# Patient Record
Sex: MALE | Race: WHITE | URBAN - METROPOLITAN AREA
[De-identification: names, ages, dates, MRNs, and addresses within clinical notes are randomized per-mention and may not be internally consistent; named-entity substitution may affect disease eponyms.]

---

## 2017-01-31 ENCOUNTER — TRANSFERRED RECORDS (OUTPATIENT)
Dept: HEALTH INFORMATION MANAGEMENT | Facility: CLINIC | Age: 67
End: 2017-01-31

## 2017-04-27 ENCOUNTER — TRANSFERRED RECORDS (OUTPATIENT)
Dept: HEALTH INFORMATION MANAGEMENT | Facility: CLINIC | Age: 67
End: 2017-04-27

## 2017-05-08 ENCOUNTER — TELEPHONE (OUTPATIENT)
Dept: CT IMAGING | Facility: CLINIC | Age: 67
End: 2017-05-08

## 2017-05-08 DIAGNOSIS — C83.398 DIFFUSE LARGE B-CELL LYMPHOMA OF EXTRANODAL SITE: Primary | ICD-10-CM

## 2017-05-08 NOTE — TELEPHONE ENCOUNTER
Patient called in to schedule for Imaging exam  : CT Chest/Abdomen/Pelvis w Contrast, Pt would like to bernadette. same exam as last time. Chest/Abdomen/Pelvis w Contrast   But there was no order for us to schedule the exam.    If provider wishes for patient to have this exam done, please put in the order.    When this is completed, send a message back to us and we will call pt to schedule.  Pt prefers us to call them back at : [phone]    Thank you for your time. This is very much appreciated.     Thank you,  [facility]    Office: [phone]   Fax: [phone]

## 2017-07-24 ENCOUNTER — DOCUMENTATION ONLY (OUTPATIENT)
Dept: LAB | Facility: CLINIC | Age: 67
End: 2017-07-24

## 2017-07-26 ENCOUNTER — RADIANT APPOINTMENT (OUTPATIENT)
Dept: CT IMAGING | Facility: CLINIC | Age: 67
End: 2017-07-26
Attending: INTERNAL MEDICINE
Payer: MEDICARE

## 2017-07-26 DIAGNOSIS — C83.398 DIFFUSE LARGE B-CELL LYMPHOMA OF EXTRANODAL SITE: ICD-10-CM

## 2017-07-26 DIAGNOSIS — C83.398 DIFFUSE LARGE B-CELL LYMPHOMA OF EXTRANODAL SITE: Primary | ICD-10-CM

## 2017-07-26 LAB
ALBUMIN SERPL-MCNC: 4.1 G/DL (ref 3.4–5)
ALP SERPL-CCNC: 74 U/L (ref 40–150)
ALT SERPL W P-5'-P-CCNC: 42 U/L (ref 0–70)
ANION GAP SERPL CALCULATED.3IONS-SCNC: 9 MMOL/L (ref 3–14)
AST SERPL W P-5'-P-CCNC: 40 U/L (ref 0–45)
BASOPHILS # BLD AUTO: 0 10E9/L (ref 0–0.2)
BASOPHILS NFR BLD AUTO: 0.4 %
BILIRUB SERPL-MCNC: 0.7 MG/DL (ref 0.2–1.3)
BUN SERPL-MCNC: 9 MG/DL (ref 7–30)
CALCIUM SERPL-MCNC: 8.6 MG/DL (ref 8.5–10.1)
CHLORIDE SERPL-SCNC: 99 MMOL/L (ref 94–109)
CO2 SERPL-SCNC: 25 MMOL/L (ref 20–32)
CREAT SERPL-MCNC: 0.7 MG/DL (ref 0.66–1.25)
DIFFERENTIAL METHOD BLD: ABNORMAL
EOSINOPHIL # BLD AUTO: 0.1 10E9/L (ref 0–0.7)
EOSINOPHIL NFR BLD AUTO: 1.4 %
ERYTHROCYTE [DISTWIDTH] IN BLOOD BY AUTOMATED COUNT: 13.3 % (ref 10–15)
GFR SERPL CREATININE-BSD FRML MDRD: NORMAL ML/MIN/1.7M2
GLUCOSE SERPL-MCNC: 93 MG/DL (ref 70–99)
HCT VFR BLD AUTO: 40.8 % (ref 40–53)
HGB BLD-MCNC: 14.6 G/DL (ref 13.3–17.7)
LDH SERPL L TO P-CCNC: 202 U/L (ref 85–227)
LYMPHOCYTES # BLD AUTO: 0.7 10E9/L (ref 0.8–5.3)
LYMPHOCYTES NFR BLD AUTO: 14.2 %
MCH RBC QN AUTO: 32.8 PG (ref 26.5–33)
MCHC RBC AUTO-ENTMCNC: 35.8 G/DL (ref 31.5–36.5)
MCV RBC AUTO: 92 FL (ref 78–100)
MONOCYTES # BLD AUTO: 0.6 10E9/L (ref 0–1.3)
MONOCYTES NFR BLD AUTO: 12.8 %
NEUTROPHILS # BLD AUTO: 3.6 10E9/L (ref 1.6–8.3)
NEUTROPHILS NFR BLD AUTO: 71.2 %
PLATELET # BLD AUTO: 143 10E9/L (ref 150–450)
POTASSIUM SERPL-SCNC: 3.9 MMOL/L (ref 3.4–5.3)
PROT SERPL-MCNC: 7.3 G/DL (ref 6.8–8.8)
RBC # BLD AUTO: 4.45 10E12/L (ref 4.4–5.9)
SODIUM SERPL-SCNC: 133 MMOL/L (ref 133–144)
WBC # BLD AUTO: 5 10E9/L (ref 4–11)

## 2017-07-26 PROCEDURE — 36415 COLL VENOUS BLD VENIPUNCTURE: CPT | Performed by: INTERNAL MEDICINE

## 2017-07-26 PROCEDURE — 80053 COMPREHEN METABOLIC PANEL: CPT | Performed by: INTERNAL MEDICINE

## 2017-07-26 PROCEDURE — 71260 CT THORAX DX C+: CPT | Performed by: RADIOLOGY

## 2017-07-26 PROCEDURE — 74177 CT ABD & PELVIS W/CONTRAST: CPT | Performed by: RADIOLOGY

## 2017-07-26 PROCEDURE — 85025 COMPLETE CBC W/AUTO DIFF WBC: CPT | Performed by: INTERNAL MEDICINE

## 2017-07-26 PROCEDURE — 83615 LACTATE (LD) (LDH) ENZYME: CPT | Performed by: INTERNAL MEDICINE

## 2017-07-26 RX ORDER — IOPAMIDOL 755 MG/ML
103 INJECTION, SOLUTION INTRAVASCULAR ONCE
Status: COMPLETED | OUTPATIENT
Start: 2017-07-26 | End: 2017-07-26

## 2017-07-26 RX ADMIN — IOPAMIDOL 103 ML: 755 INJECTION, SOLUTION INTRAVASCULAR at 10:12

## 2017-08-03 ENCOUNTER — ONCOLOGY VISIT (OUTPATIENT)
Dept: ONCOLOGY | Facility: CLINIC | Age: 67
End: 2017-08-03
Payer: MEDICARE

## 2017-08-03 VITALS
BODY MASS INDEX: 25.18 KG/M2 | WEIGHT: 170 LBS | HEART RATE: 86 BPM | TEMPERATURE: 97.7 F | OXYGEN SATURATION: 97 % | RESPIRATION RATE: 20 BRPM | HEIGHT: 69 IN | DIASTOLIC BLOOD PRESSURE: 91 MMHG | SYSTOLIC BLOOD PRESSURE: 144 MMHG

## 2017-08-03 DIAGNOSIS — C83.398 DIFFUSE LARGE B-CELL LYMPHOMA OF EXTRANODAL SITE: Primary | ICD-10-CM

## 2017-08-03 PROCEDURE — 99214 OFFICE O/P EST MOD 30 MIN: CPT | Performed by: INTERNAL MEDICINE

## 2017-08-03 RX ORDER — ALFUZOSIN HYDROCHLORIDE 10 MG/1
10 TABLET, EXTENDED RELEASE ORAL DAILY
COMMUNITY

## 2017-08-03 ASSESSMENT — PAIN SCALES - GENERAL: PAINLEVEL: NO PAIN (0)

## 2017-08-03 NOTE — PROGRESS NOTES
ONCOLOGY DIAGNOSIS:  August 2015, diagnosed with stage IIIB diffuse large B-cell lymphoma, IPI score 4 (high).       CT initial staging from September 2015 revealed extensive infiltrative left mid abdominal mass measuring at least 15 x 10 cm, with an SUV of 31, with associated direct invasion of the spleen and encasement of the upper abdominal bowel loops and vascular structures.  There was associated extensive multifocal mesenteric, retroperitoneal, retrocrural, mediastinal, and supraclavicular adenopathy, compatible with widespread lymphoma.  Hypermetabolic pleural-based mass in left lower hemithorax with associated small to moderate left pleural effusion.  FNA of peripancreatic lymph node (August 26, 2015) revealed diffuse large B-cell lymphoma, germinal center type.  Flow cytometry showed B-cell lymphoma with kappa light chain restriction.  CD10 positive, CD20 positive, with CD10 indicating germinal center type.  Bone marrow biopsy performed on August 20, 2015, revealed no morphologic evidence of lymphoma, 30% cellular marrow with normal maturation of cell lines, absent iron stores.        THERAPY TO DATE:  September 2015 to December 2015: R-CHOP.        INTERVAL HISTORY:  Myke Shankar is a 67-year-old gentleman diagnosed with stage IIIB diffuse large B-cell lymphoma 08/2015 after presenting to an urgent care in [place] with a 2-month history of epigastric discomfort associated with nausea.  The patient went on to receive 6 cycles of R-CHOP and presents to clinic in followup.  He does split his time between [place] and [place] and has received his treatment in both places. Patient states he is doing well since last seen. Did have a cough that he is seeing his primary for. Recently was taken off lisinopril thinking this may be the culprit. Otherwise, denies any fevers, chills, nausea, vomiting, chest pain, shortness of breath. Neuropathy in feet stable, does not effect ADL. No current abdominal discomfort.   "No new palpable masses and the remainder of comprehensive review of systems is negative.      PAST MEDICAL HISTORY: (No new medical history since last visit per patient.)  1.  Diffuse large B-cell lymphoma, IPI score 4, see above.   2.  Hypertension.   3.  Hypercholesterolemia.      ALLERGIES:  No known drug allergies.      FAMILY HISTORY:  Maternal grandfather had gastric cancer. (Not addressed on today's visit.)     SOCIAL HISTORY:  , has 3 children. Comes in alone today. Splits his time between [place] and [place]. Non-smoker.      PHYSICAL EXAMINATION:   VITAL SIGNS: BP (!) 144/91  Pulse 86  Temp 97.7  F (36.5  C) (Oral)  Resp 20  Ht 1.753 m (5' 9.02\")  Wt 77.1 kg (170 lb)  SpO2 97%  BMI 25.09 kg/m2  GENERAL:  Comfortable, in no acute distress, pleasant.   HEENT:  Atraumatic, normocephalic.  Pupils equal, round, reactive.  Sclerae anicteric.  Oropharynx moist mucus membranes.  No lesions, ulcers or exudate.   NECK:  Supple, full range of motion.  Trachea midline.   HEART:  Regular rate and rhythm, normal S1, S2.  No murmurs or gallops.  No edema.   LUNGS:  Clear to auscultation bilaterally.  No crackles or wheezes.  Normal respiratory effort.   GASTROINTESTINAL:  Abdomen positive bowel sounds.  Soft, nontender, nondistended, no hepatosplenomegaly.   EXTREMITIES:  No cyanosis, warm.   MUSCULOSKELETAL:  No point tenderness.   LYMPHATICS:  No cervical, supraclavicular, axillary nodes palpable.   SKIN:  No petechiae or rashes.   NEUROLOGIC:  Alert and oriented.      LABORATORY DATA:     7/26/2017   AlkPhos 74   ALT 42   AST 40      Glucose 93   WBC 5.0   Hemoglobin 14.6   Hematocrit 40.8   Platelet 143 (L)     IMAGING DATA:    CT C/A/P (7/26/17):  continued complete response by Lugano criteria:  1. Stable tiny sub-3 mm bilateral pulmonary nodules. No new pulmonary nodules. Close attention on follow-up studies is recommended.  2. Stable hypoattenuating foci in the liver, as described above. " Close attention on follow-up studies is recommended.  3. 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  4. Irregular appearing hypoattenuation in the spleen, stable. Close attention on follow-up studies is recommended.    CT chest, abdomen and pelvis from 07/26/2016 shows scattered tiny lung nodules, no suspicious lung nodules, no pathological lymphadenopathy in chest, abdomen and pelvis, evidence of treated lymphoma upper abdomen including stable hypoattenuating lesion in the spleen and slight adjacent stranding soft tissue density, occluded splenic vein with gastric varices.      ASSESSMENT AND PLAN:   1.  Stage IIIB, diffuse large B-cell lymphoma. No evidence of recurrence or most recent imaging.  WBC and platelets are stable. Reviewed NCCN guidelines for surveillance. Patient in nearly 2 years out from completion of therapy (12/2017). Recommend following up with Oncologist in [place] when he goes back. RTC to see us in 1 year. Would do one more CT in [place] after which would only do scans if develops concerning symptoms.   2. Neuropathy.  Stable.  Does not affect activities of daily living.  No intervention at this time.   3. HCM. Discussed the importance of healthy lifestyle with diet and exercise.    CONRADO GILLETTE MD        cc: Copy for Provider        Tomy Mustafa MD

## 2017-08-03 NOTE — MR AVS SNAPSHOT
After Visit Summary   8/3/2017    Myke Shankar    MRN: [id number]           Patient Information     Date Of Birth          1950        Visit Information        Provider Department      8/3/2017 10:30 AM Dana Reis MD [facility]        Today's Diagnoses     Diffuse large B-cell lymphoma of extranodal site (H)    -  1       Follow-ups after your visit        Your next 10 appointments already scheduled     Jul 18, 2018 11:30 AM CDT   LAB with LAB FIRST FLOOR [facility] ([facility])    [address] [place] [address] [id number] [phone]           Please do not eat 10-12 hours before your appointment if you are coming in fasting for labs on lipids, cholesterol, or glucose (sugar). This does not apply to pregnant women. Water, hot tea and black coffee (with nothing added) are okay. Do not drink other fluids, diet soda or chew gum.            Jul 18, 2018 12:00 PM CDT   Return Visit with Dana Reis MD   [facility] ([facility])    [address] [place] [address] [id number] [phone]              Future tests that were ordered for you today     Open Future Orders        Priority Expected Expires Ordered    *CBC with platelets differential Routine  4/25/2019 4/25/2018    Comprehensive metabolic panel Routine 4/25/2018 4/25/2019 4/25/2018    Lactate Dehydrogenase Routine  4/25/2019 4/25/2018            Who to contact     If you have questions or need follow up information about today's clinic visit or your schedule please contact [facility] directly at [phone].  Normal or non-critical lab and imaging results will be communicated to you by MyChart, letter or phone within 4 business days after the clinic has received the results. If you do not hear from us within 7 days, please contact the clinic through MyChart or phone. If you have a critical or  "abnormal lab result, we will notify you by phone as soon as possible.  Submit refill requests through [email or web address] or call your pharmacy and they will forward the refill request to us. Please allow 3 business days for your refill to be completed.          Additional Information About Your Visit        EatingWellhart Information     [email or web address] gives you secure access to your electronic health record. If you see a primary care provider, you can also send messages to your care team and make appointments. If you have questions, please call your primary care clinic.  If you do not have a primary care provider, please call [phone] and they will assist you.      [email or web address] is an electronic gateway that provides easy, online access to your medical records. With [email or web address], you can request a clinic appointment, read your test results, renew a prescription or communicate with your care team.     To access your existing account, please contact your [facility] or call [phone] for assistance.        Care EveryWhere ID     This is your Care EveryWhere ID. This could be used by other organizations to access your [facility] medical records  [id number]        Your Vitals Were     Pulse Temperature Respirations Height Pulse Oximetry BMI (Body Mass Index)    86 97.7  F (36.5  C) (Oral) 20 1.753 m (5' 9.02\") 97% 25.09 kg/m2       Blood Pressure from Last 3 Encounters:   08/03/17 (!) 144/91   07/28/16 121/78   06/07/16 (!) 128/91    Weight from Last 3 Encounters:   08/03/17 77.1 kg (170 lb)   07/28/16 76.6 kg (168 lb 12.8 oz)   06/07/16 73.9 kg (163 lb)              Today, you had the following     No orders found for display       Primary Care Provider Office Phone # Fax #    Tomy Damico [phone]       [facility] [address]  [facility] [address]        Equal Access to Services     PABLO RAMSEY AH: Sara Quezada, blayneda dave, qaybta edgard arteaga " daija smartayan carlagiovanni raygoza'aan ah. So [facility] [phone].    ATENCIÓN: Si habla mary, tiene a eldridge disposición servicios gratuitos de asistencia lingüística. Jamar al [phone].    We comply with applicable federal civil rights laws and [place] laws. We do not discriminate on the basis of race, color, national origin, age, disability, sex, sexual orientation, or gender identity.            Thank you!     Thank you for choosing [facility]  for your care. Our goal is always to provide you with excellent care. Hearing back from our patients is one way we can continue to improve our services. Please take a few minutes to complete the written survey that you may receive in the mail after your visit with us. Thank you!             Your Updated Medication List - Protect others around you: Learn how to safely use, store and throw away your medicines at www.disposemymeds.org.          This list is accurate as of 8/3/17 11:59 PM.  Always use your most recent med list.                   Brand Name Dispense Instructions for use Diagnosis    alfuzosin 10 MG 24 hr tablet    UROXATRAL     Take 10 mg by mouth daily        LISINOPRIL PO      Take 10 mg by mouth daily        PRILOSEC PO      Take 20 mg by mouth every morning        SIMVASTATIN PO      Take 10 mg by mouth At Bedtime

## 2017-08-03 NOTE — NURSING NOTE
"Oncology Rooming Note    August 3, 2017 10:31 AM   Myke Shankar is a 67 year old male who presents for:    Chief Complaint   Patient presents with     Oncology Clinic Visit     f/u w/CT and lab results     Initial Vitals: There were no vitals taken for this visit. Estimated body mass index is 24.92 kg/(m^2) as calculated from the following:    Height as of 7/28/16: 1.753 m (5' 9.02\").    Weight as of 7/28/16: 76.6 kg (168 lb 12.8 oz). There is no height or weight on file to calculate BSA.  Data Unavailable Comment: Data Unavailable   No LMP for male patient.  Allergies reviewed: Yes  Medications reviewed: Yes    Medications: Medication refills not needed today.  Pharmacy name entered into [facility]: [facility] [address] [place] [address] [address] AT [facility] [address]    Clinical concerns:    8 minutes for nursing intake (face to face time)     JASMIN LITTLE LPN              "

## 2018-01-25 ENCOUNTER — TRANSFERRED RECORDS (OUTPATIENT)
Dept: HEALTH INFORMATION MANAGEMENT | Facility: CLINIC | Age: 68
End: 2018-01-25

## 2018-01-25 LAB
ALT SERPL-CCNC: 20 U/L (ref 0–40)
AST SERPL-CCNC: 20 U/L (ref 0–40)
CREAT SERPL-MCNC: 0.8 MG/DL (ref 0.3–1.2)
GFR SERPL CREATININE-BSD FRML MDRD: 103 ML/MIN/1.73M2 (ref 60–200)
GLUCOSE SERPL-MCNC: 93 MG/DL (ref 70–105)
POTASSIUM SERPL-SCNC: 3.8 MEQ/L (ref 3.3–5.1)

## 2018-02-06 ENCOUNTER — TRANSFERRED RECORDS (OUTPATIENT)
Dept: HEALTH INFORMATION MANAGEMENT | Facility: CLINIC | Age: 68
End: 2018-02-06

## 2018-02-08 ENCOUNTER — TRANSFERRED RECORDS (OUTPATIENT)
Dept: HEALTH INFORMATION MANAGEMENT | Facility: CLINIC | Age: 68
End: 2018-02-08

## 2018-04-25 ENCOUNTER — CARE COORDINATION (OUTPATIENT)
Dept: ONCOLOGY | Facility: CLINIC | Age: 68
End: 2018-04-25

## 2018-04-25 DIAGNOSIS — C83.398 DIFFUSE LARGE B-CELL LYMPHOMA OF EXTRANODAL SITE: Primary | ICD-10-CM

## 2018-04-25 NOTE — PROGRESS NOTES
"Received telephone call from patient, stating that he has now returned to [place] from [place], and is ready to schedule his follow-up visit with Dr. Reis and labs this summer.  Patient is also under the impression that he would be due for another CT scan this summer - however, upon reviewing notes received from [facility] (Dr. Mustafa) from patient's February 2018 visit, notes state \"CT C/A/P with contrast no more often than every 6 months for 2 years after completion of treatment, then only as clinically indicated.\"  Noted patient completed treatment in December 2015, so is >2 years out from treatment completion.  Writer spoke with Angela, nurse with Dr. Mustafa, who clarifies that patient will not need to have another CT scan completed this summer - only labs and follow-up visit with Dr. Reis.      Call placed to patient with above recommendations, patient verbalizes understanding and is in agreement with this plan.  Office visit notes and CT report from [facility] scanned into Epic.  Writer also put in request to have 2/6/18 CT C/A/P images sent to our clinic.  Follow-up visit with Dr. Reis, with lab appointment prior, has been scheduled for 7/18/18 at patient's request.  Appointment information mailed to patient's home address.    Irvin Etienne, RN, BSN, OCN  Oncology Care Coordinator  [facility]  "

## 2018-04-30 ENCOUNTER — CARE COORDINATION (OUTPATIENT)
Dept: ONCOLOGY | Facility: CLINIC | Age: 68
End: 2018-04-30

## 2018-04-30 NOTE — PROGRESS NOTES
Received imaging disc with report from patient's 2/6/18 CT C/A/P done through [facility].  Disc/report to [facility] for archiving.    Irvin Etienne, RN, BSN, OCN  Oncology Care Coordinator  [facility]

## 2018-07-26 ENCOUNTER — ONCOLOGY VISIT (OUTPATIENT)
Dept: ONCOLOGY | Facility: CLINIC | Age: 68
End: 2018-07-26
Payer: MEDICARE

## 2018-07-26 VITALS
OXYGEN SATURATION: 98 % | DIASTOLIC BLOOD PRESSURE: 92 MMHG | RESPIRATION RATE: 20 BRPM | SYSTOLIC BLOOD PRESSURE: 149 MMHG | HEART RATE: 78 BPM | TEMPERATURE: 98.9 F | HEIGHT: 69 IN | WEIGHT: 168 LBS | BODY MASS INDEX: 24.88 KG/M2

## 2018-07-26 DIAGNOSIS — G62.9 NEUROPATHY: ICD-10-CM

## 2018-07-26 DIAGNOSIS — C83.398 DIFFUSE LARGE B-CELL LYMPHOMA OF EXTRANODAL SITE: Primary | ICD-10-CM

## 2018-07-26 DIAGNOSIS — C83.398 DIFFUSE LARGE B-CELL LYMPHOMA OF EXTRANODAL SITE: ICD-10-CM

## 2018-07-26 LAB
ALBUMIN SERPL-MCNC: 4.2 G/DL (ref 3.4–5)
ALP SERPL-CCNC: 70 U/L (ref 40–150)
ALT SERPL W P-5'-P-CCNC: 40 U/L (ref 0–70)
ANION GAP SERPL CALCULATED.3IONS-SCNC: 8 MMOL/L (ref 3–14)
AST SERPL W P-5'-P-CCNC: 32 U/L (ref 0–45)
BASOPHILS # BLD AUTO: 0 10E9/L (ref 0–0.2)
BASOPHILS NFR BLD AUTO: 0.5 %
BILIRUB SERPL-MCNC: 0.8 MG/DL (ref 0.2–1.3)
BUN SERPL-MCNC: 13 MG/DL (ref 7–30)
CALCIUM SERPL-MCNC: 8.8 MG/DL (ref 8.5–10.1)
CHLORIDE SERPL-SCNC: 100 MMOL/L (ref 94–109)
CO2 SERPL-SCNC: 27 MMOL/L (ref 20–32)
CREAT SERPL-MCNC: 0.73 MG/DL (ref 0.66–1.25)
DIFFERENTIAL METHOD BLD: ABNORMAL
EOSINOPHIL # BLD AUTO: 0 10E9/L (ref 0–0.7)
EOSINOPHIL NFR BLD AUTO: 1 %
ERYTHROCYTE [DISTWIDTH] IN BLOOD BY AUTOMATED COUNT: 12.7 % (ref 10–15)
GFR SERPL CREATININE-BSD FRML MDRD: >90 ML/MIN/1.7M2
GLUCOSE SERPL-MCNC: 118 MG/DL (ref 70–99)
HCT VFR BLD AUTO: 43 % (ref 40–53)
HGB BLD-MCNC: 14.9 G/DL (ref 13.3–17.7)
IMM GRANULOCYTES # BLD: 0 10E9/L (ref 0–0.4)
IMM GRANULOCYTES NFR BLD: 0.2 %
LDH SERPL L TO P-CCNC: 189 U/L (ref 85–227)
LYMPHOCYTES # BLD AUTO: 0.6 10E9/L (ref 0.8–5.3)
LYMPHOCYTES NFR BLD AUTO: 15.1 %
MCH RBC QN AUTO: 32.4 PG (ref 26.5–33)
MCHC RBC AUTO-ENTMCNC: 34.7 G/DL (ref 31.5–36.5)
MCV RBC AUTO: 94 FL (ref 78–100)
MONOCYTES # BLD AUTO: 0.8 10E9/L (ref 0–1.3)
MONOCYTES NFR BLD AUTO: 19.3 %
NEUTROPHILS # BLD AUTO: 2.6 10E9/L (ref 1.6–8.3)
NEUTROPHILS NFR BLD AUTO: 63.9 %
PLATELET # BLD AUTO: 137 10E9/L (ref 150–450)
POTASSIUM SERPL-SCNC: 4 MMOL/L (ref 3.4–5.3)
PROT SERPL-MCNC: 7.7 G/DL (ref 6.8–8.8)
RBC # BLD AUTO: 4.6 10E12/L (ref 4.4–5.9)
SODIUM SERPL-SCNC: 135 MMOL/L (ref 133–144)
WBC # BLD AUTO: 4.1 10E9/L (ref 4–11)

## 2018-07-26 PROCEDURE — 36415 COLL VENOUS BLD VENIPUNCTURE: CPT | Performed by: INTERNAL MEDICINE

## 2018-07-26 PROCEDURE — 99213 OFFICE O/P EST LOW 20 MIN: CPT | Performed by: INTERNAL MEDICINE

## 2018-07-26 PROCEDURE — 83615 LACTATE (LD) (LDH) ENZYME: CPT | Performed by: INTERNAL MEDICINE

## 2018-07-26 PROCEDURE — 85025 COMPLETE CBC W/AUTO DIFF WBC: CPT | Performed by: INTERNAL MEDICINE

## 2018-07-26 PROCEDURE — 80053 COMPREHEN METABOLIC PANEL: CPT | Performed by: INTERNAL MEDICINE

## 2018-07-26 RX ORDER — PREDNISONE 20 MG/1
TABLET ORAL
Refills: 0 | COMMUNITY
Start: 2017-09-19

## 2018-07-26 RX ORDER — METOPROLOL SUCCINATE 25 MG/1
TABLET, EXTENDED RELEASE ORAL
Refills: 0 | COMMUNITY
Start: 2018-05-30

## 2018-07-26 ASSESSMENT — PAIN SCALES - GENERAL: PAINLEVEL: NO PAIN (1)

## 2018-07-26 NOTE — MR AVS SNAPSHOT
After Visit Summary   7/26/2018    Myke Shankar    MRN: [id number]           Patient Information     Date Of Birth          1950        Visit Information        Provider Department      7/26/2018 12:30 PM Dana Reis MD [facility]        Today's Diagnoses     Diffuse large B-cell lymphoma of extranodal site (H)    -  1    Neuropathy           Follow-ups after your visit        Additional Services     ACUPUNCTURE REFERRAL                 Future tests that were ordered for you today     Open Standing Orders        Priority Remaining Interval Expires Ordered    *CBC with platelets differential Routine 5/5 7/26/2019 7/26/2018    Hepatic panel Routine 5/5 7/26/2019 7/26/2018    Lactate Dehydrogenase Routine 5/5 7/26/2019 7/26/2018            Who to contact     If you have questions or need follow up information about today's clinic visit or your schedule please contact [facility] directly at [phone].  Normal or non-critical lab and imaging results will be communicated to you by Clarivoyhart, letter or phone within 4 business days after the clinic has received the results. If you do not hear from us within 7 days, please contact the clinic through Overtone or phone. If you have a critical or abnormal lab result, we will notify you by phone as soon as possible.  Submit refill requests through Overtone or call your pharmacy and they will forward the refill request to us. Please allow 3 business days for your refill to be completed.          Additional Information About Your Visit        Clarivoyhart Information     Overtone gives you secure access to your electronic health record. If you see a primary care provider, you can also send messages to your care team and make appointments. If you have questions, please call your primary care clinic.  If you do not have a primary care provider, please call [phone] and they will assist you.      Overtone is an  "electronic gateway that provides easy, online access to your medical records. With [employer], you can request a clinic appointment, read your test results, renew a prescription or communicate with your care team.     To access your existing account, please contact your [facility] or call [phone] for assistance.        Care EveryWhere ID     This is your Care EveryWhere ID. This could be used by other organizations to access your [facility] medical records  [id number]        Your Vitals Were     Pulse Temperature Respirations Height Pulse Oximetry BMI (Body Mass Index)    78 98.9  F (37.2  C) (Oral) 20 1.753 m (5' 9\") 98% 24.81 kg/m2       Blood Pressure from Last 3 Encounters:   07/26/18 (!) 149/92   08/03/17 (!) 144/91   07/28/16 121/78    Weight from Last 3 Encounters:   07/26/18 76.2 kg (168 lb)   08/03/17 77.1 kg (170 lb)   07/28/16 76.6 kg (168 lb 12.8 oz)              We Performed the Following     ACUPUNCTURE REFERRAL        Primary Care Provider Office Phone # Fax #    Tomy Damico [phone]       [facility] [address]  [facility] [address]        Equal Access to Services     PABLO RAMSEY : Hadii aad ku hadasho Soomaali, waaxda luqadaha, qaybta kaalmada adeegyada, waxay idiin hayaan adeayan lopez lalamont crawford. So [facility] [phone].    ATENCIÓN: Si habla español, tiene a eldridge disposición servicios gratuitos de asistencia lingüística. Llame al [phone].    We comply with applicable federal civil rights laws and [place] laws. We do not discriminate on the basis of race, color, national origin, age, disability, sex, sexual orientation, or gender identity.            Thank you!     Thank you for choosing [facility]  for your care. Our goal is always to provide you with excellent care. Hearing back from our patients is one way we can continue to improve our services. Please take a few minutes to complete the written survey that " you may receive in the mail after your visit with us. Thank you!             Your Updated Medication List - Protect others around you: Learn how to safely use, store and throw away your medicines at www.disposemymeds.org.          This list is accurate as of 7/26/18 11:59 PM.  Always use your most recent med list.                   Brand Name Dispense Instructions for use Diagnosis    alfuzosin 10 MG 24 hr tablet    UROXATRAL     Take 10 mg by mouth daily        LISINOPRIL PO      Take 10 mg by mouth daily        metoprolol succinate 25 MG 24 hr tablet    TOPROL-XL     TK 1 T PO QD        metoprolol-hydrochlorothiazide 25-12.5 MG Tb24 per tablet    DUTOPROL     Take 1 tablet by mouth daily        predniSONE 20 MG tablet    DELTASONE          PRILOSEC PO      Take 20 mg by mouth every morning        SIMVASTATIN PO      Take 10 mg by mouth At Bedtime

## 2018-07-26 NOTE — PROGRESS NOTES
Visit Date:   07/26/2018      ONCOLOGY DIAGNOSIS:  August 2015: Diagnosed with stage IIIB diffuse large B-cell lymphoma, IPI score 4 (high). CT initial staging from September 2015 revealed extensive infiltrative left mid abdominal mass measuring at least 15 x 10 cm, with an SUV of 31, with associated direct invasion of the spleen and encasement of the upper abdominal bowel loops and vascular structures.  There was associated extensive multifocal mesenteric, retroperitoneal, retrocrural, mediastinal, and supraclavicular adenopathy, compatible with widespread lymphoma.  Hypermetabolic pleural-based mass in left lower hemithorax with associated small to moderate left pleural effusion.  FNA of peripancreatic lymph node (August 26, 2015) revealed diffuse large B-cell lymphoma, germinal center type.  Flow cytometry showed B-cell lymphoma with kappa light chain restriction.  CD10 positive, CD20 positive, with CD10 indicating germinal center type.  Bone marrow biopsy performed on August 20, 2015, revealed no morphologic evidence of lymphoma, 30% cellular marrow with normal maturation of cell lines, absent iron stores.        THERAPY TO DATE:  September 2015 to December 2015: R-CHOP.       INTERVAL HISTORY:  Myke Shankar is a 68-year-old gentleman diagnosed with stage III-B diffuse large B-cell lymphoma August 2015 after presenting to Urgent Care in [place] with a 2-month history of epigastric discomfort associated with nausea.  The patient went on to receive 6 cycles of R-CHOP and presents to clinic for a follow-up.  On today's visit, he tells me he is doing well, does have some neuropathy in his feet but is able to walk without difficulty.  It occasionally affects his balance.  He had some knee pain and was recently started on a steroid taper by his primary.  Denies any fevers, chills, nausea, vomiting, chest pain, shortness of breath or cough.  No abdominal discomfort, no new palpable masses, and the remainder of  comprehensive review of systems is negative.      PAST MEDICAL HISTORY (per patient, no new medical history since last seen):   1.  Diffuse large B-cell lymphoma, IPI score of 4.  See above.    2.  Hypertension.   3.  Hypercholesterolemia.      ALLERGIES:  LISINOPRIL CAUSES COUGH.       FAMILY HISTORY:  Maternal grandfather had gastric cancer.  Not addressed on today's visit.      SOCIAL HISTORY:  , has 3 children.  Comes in alone.  Splits his time between [place] and [place].  Nonsmoker.  Has a place in [place] [address] and [facility].  Also spends time in his cabin in [place].       PHYSICAL EXAMINATION:   VITAL SIGNS:  Blood pressure 149/92, pulse 78, respirations 20, temperature 98.9, pulse ox 98% on room air, weight 168 pounds, BMI 24.81.   GENERAL:  Comfortable, in no acute distress, pleasant.   HEENT:  Atraumatic, normocephalic.  Pupils are equal, round and reactive.  Sclerae are anicteric.  Oropharynx:  Moist mucous membranes.  No lesions, ulcers or exudate.   NECK:  Supple, full range of motion.  Trachea midline.   HEART:  Regular rate and rhythm, normal S1, S2, no murmurs, gallop.  No edema.   LUNGS:  Clear to auscultation bilaterally.  No crackles or wheezes.  Normal respiratory effort.   ABDOMEN:  Positive bowel sounds.  Soft, nontender, nondistended.  No hepatosplenomegaly.   EXTREMITIES:  No cyanosis, warm.   MUSCULOSKELETAL:  No point tenderness.   LYMPHATICS:  No cervical, supraclavicular or axillary nodes palpable.   SKIN:  No petechiae or rashes.   NEUROLOGIC:  Alert and oriented.      LABORATORY DATA:  Alkaline phosphatase 70, ALT 40, AST 32, , WBC 4.1, hemoglobin 14.9, hematocrit 43, platelet 137.  ANC is 2.6.      IMAGING:  CT chest, abdomen and pelvis from 2/7/2018 compared to CT from July 2017 previously noted 3 mm pulmonary nodules no longer visualized, other stable findings without definitive evidence of recurrent lymphoma.  Stable low-density lesion in the spleen just  lateral to the pancreatic tail and corresponds to site of previous extensive disease noted at the time of presentation and may represent scarring.       ASSESSMENT AND PLAN:   1.  Stage III-B diffuse large B-cell lymphoma.  No evidence of recurrence by history, physical, or most recent imaging.  Labs are stable.  The patient is now 2 years out.  We will continue to see him every 6 months.  He will alternate between [place] and [place].  He will be seen by his oncologist in 6 months and will come back to see us in 1 year.  We will do imaging if symptoms dictate.  We will check CBC with diff, LFTs, and LDH on next visit.   2.  Neuropathy.  Stable to improving.  Offered acupuncture.  The patient will think about this.  Referral placed.   3.  Healthcare maintenance.  Discussed the importance of healthy lifestyle with diet and exercise.  Recommended 150 minutes of moderate exercise a week with diet rich in fruits, vegetables, fiber and avoiding processed meats.  The patient assured me he does try to eat healthy and is very active, especially working on his cabin.         CONRADO GILLETTE MD             D: 2018   T: 2018   MT: CG      Name:     YAMILETH JOYA   MRN:      [id number]        Account:      [id number]   :      1950           Visit Date:   2018      Document: [id number]       cc: Tomy Mustafa MD

## 2018-07-26 NOTE — LETTER
7/26/2018         RE: Myke Shankar  [address] [place] [address]        Dear Colleague,    Thank you for referring your patient, Myke Shankar, to the [facility]. Please see a copy of my visit note below.    Visit Date:   07/26/2018      ONCOLOGY DIAGNOSIS:  August 2015: Diagnosed with stage IIIB diffuse large B-cell lymphoma, IPI score 4 (high). CT initial staging from September 2015 revealed extensive infiltrative left mid abdominal mass measuring at least 15 x 10 cm, with an SUV of 31, with associated direct invasion of the spleen and encasement of the upper abdominal bowel loops and vascular structures.  There was associated extensive multifocal mesenteric, retroperitoneal, retrocrural, mediastinal, and supraclavicular adenopathy, compatible with widespread lymphoma.  Hypermetabolic pleural-based mass in left lower hemithorax with associated small to moderate left pleural effusion.  FNA of peripancreatic lymph node (August 26, 2015) revealed diffuse large B-cell lymphoma, germinal center type.  Flow cytometry showed B-cell lymphoma with kappa light chain restriction.  CD10 positive, CD20 positive, with CD10 indicating germinal center type.  Bone marrow biopsy performed on August 20, 2015, revealed no morphologic evidence of lymphoma, 30% cellular marrow with normal maturation of cell lines, absent iron stores.        THERAPY TO DATE:  September 2015 to December 2015: R-CHOP.       INTERVAL HISTORY:  Myke Shankar is a 68-year-old gentleman diagnosed with stage III-B diffuse large B-cell lymphoma August 2015 after presenting to Urgent Care in [place] with a 2-month history of epigastric discomfort associated with nausea.  The patient went on to receive 6 cycles of R-CHOP and presents to clinic for a follow-up.  On today's visit, he tells me he is doing well, does have some neuropathy in his feet but is able to walk without difficulty.  It occasionally affects his  balance.  He had some knee pain and was recently started on a steroid taper by his primary.  Denies any fevers, chills, nausea, vomiting, chest pain, shortness of breath or cough.  No abdominal discomfort, no new palpable masses, and the remainder of comprehensive review of systems is negative.      PAST MEDICAL HISTORY (per patient, no new medical history since last seen):   1.  Diffuse large B-cell lymphoma, IPI score of 4.  See above.    2.  Hypertension.   3.  Hypercholesterolemia.      ALLERGIES:  LISINOPRIL CAUSES COUGH.       FAMILY HISTORY:  Maternal grandfather had gastric cancer.  Not addressed on today's visit.      SOCIAL HISTORY:  , has 3 children.  Comes in alone.  Splits his time between [place] and [place].  Nonsmoker.  Has a place in [place] by  and [facility].  Also spends time in his cabin in [place].       PHYSICAL EXAMINATION:   VITAL SIGNS:  Blood pressure 149/92, pulse 78, respirations 20, temperature 98.9, pulse ox 98% on room air, weight 168 pounds, BMI 24.81.   GENERAL:  Comfortable, in no acute distress, pleasant.   HEENT:  Atraumatic, normocephalic.  Pupils are equal, round and reactive.  Sclerae are anicteric.  Oropharynx:  Moist mucous membranes.  No lesions, ulcers or exudate.   NECK:  Supple, full range of motion.  Trachea midline.   HEART:  Regular rate and rhythm, normal S1, S2, no murmurs, gallop.  No edema.   LUNGS:  Clear to auscultation bilaterally.  No crackles or wheezes.  Normal respiratory effort.   ABDOMEN:  Positive bowel sounds.  Soft, nontender, nondistended.  No hepatosplenomegaly.   EXTREMITIES:  No cyanosis, warm.   MUSCULOSKELETAL:  No point tenderness.   LYMPHATICS:  No cervical, supraclavicular or axillary nodes palpable.   SKIN:  No petechiae or rashes.   NEUROLOGIC:  Alert and oriented.      LABORATORY DATA:  Alkaline phosphatase 70, ALT 40, AST 32, , WBC 4.1, hemoglobin 14.9, hematocrit 43, platelet 137.  ANC is 2.6.      IMAGING:  CT  chest, abdomen and pelvis from 2018 compared to CT from 2017 previously noted 3 mm pulmonary nodules no longer visualized, other stable findings without definitive evidence of recurrent lymphoma.  Stable low-density lesion in the spleen just lateral to the pancreatic tail and corresponds to site of previous extensive disease noted at the time of presentation and may represent scarring.       ASSESSMENT AND PLAN:   1.  Stage III-B diffuse large B-cell lymphoma.  No evidence of recurrence by history, physical, or most recent imaging.  Labs are stable.  The patient is now 2 years out.  We will continue to see him every 6 months.  He will alternate between [place] and [place].  He will be seen by his oncologist in 6 months and will come back to see us in 1 year.  We will do imaging if symptoms dictate.  We will check CBC with diff, LFTs, and LDH on next visit.   2.  Neuropathy.  Stable to improving.  Offered acupuncture.  The patient will think about this.  Referral placed.   3.  Healthcare maintenance.  Discussed the importance of healthy lifestyle with diet and exercise.  Recommended 150 minutes of moderate exercise a week with diet rich in fruits, vegetables, fiber and avoiding processed meats.  The patient assured me he does try to eat healthy and is very active, especially working on his cabin.         CONRADO REIS MD             D: 2018   T: 2018   MT: CG      Name:     YAMILETH JOYA   MRN:      [id number]        Account:      [id number]   :      1950           Visit Date:   2018      Document: [id number]       cc: Tomy Mustafa MD       Again, thank you for allowing me to participate in the care of your patient.        Sincerely,        Conrado Reis MD

## 2018-07-26 NOTE — NURSING NOTE
"Oncology Rooming Note    July 26, 2018 12:33 PM   Myke Shankar is a 68 year old male who presents for:    Chief Complaint   Patient presents with     Oncology Clinic Visit     follow up     Initial Vitals: BP (!) 149/92  Pulse 78  Temp 98.9  F (37.2  C) (Oral)  Resp 20  Ht 1.753 m (5' 9\")  Wt 76.2 kg (168 lb)  SpO2 98%  BMI 24.81 kg/m2 Estimated body mass index is 24.81 kg/(m^2) as calculated from the following:    Height as of this encounter: 1.753 m (5' 9\").    Weight as of this encounter: 76.2 kg (168 lb). Body surface area is 1.93 meters squared.  No Pain (1) Comment: predisone   No LMP for male patient.  Allergies reviewed: Yes  Medications reviewed: Yes    Medications: Medication refills not needed today.  Pharmacy name entered into [employer]: [facility] [address] [place] [address] [address] [facility] [address]         5 minutes for nursing intake (face to face time)     Lexii Leigh LPN              "

## 2019-10-01 ENCOUNTER — HEALTH MAINTENANCE LETTER (OUTPATIENT)
Age: 69
End: 2019-10-01

## 2019-12-15 ENCOUNTER — HEALTH MAINTENANCE LETTER (OUTPATIENT)
Age: 69
End: 2019-12-15

## 2021-01-15 ENCOUNTER — HEALTH MAINTENANCE LETTER (OUTPATIENT)
Age: 71
End: 2021-01-15

## 2021-09-04 ENCOUNTER — HEALTH MAINTENANCE LETTER (OUTPATIENT)
Age: 71
End: 2021-09-04

## 2022-02-19 ENCOUNTER — HEALTH MAINTENANCE LETTER (OUTPATIENT)
Age: 72
End: 2022-02-19

## 2022-10-16 ENCOUNTER — HEALTH MAINTENANCE LETTER (OUTPATIENT)
Age: 72
End: 2022-10-16

## 2023-04-01 ENCOUNTER — HEALTH MAINTENANCE LETTER (OUTPATIENT)
Age: 73
End: 2023-04-01

## 2025-06-26 ENCOUNTER — MEDICAL CORRESPONDENCE (OUTPATIENT)
Dept: HEALTH INFORMATION MANAGEMENT | Facility: CLINIC | Age: 75
End: 2025-06-26
Payer: COMMERCIAL

## 2025-06-26 ENCOUNTER — TELEPHONE (OUTPATIENT)
Dept: UROLOGY | Facility: CLINIC | Age: 75
End: 2025-06-26
Payer: MEDICARE

## 2025-06-26 NOTE — TELEPHONE ENCOUNTER
Dr. Arriaza received a direct referral from Dr. Price at [facility] and asked me to call patient to schedule a Cystectomy consult. I called patient and he declined to make an appointment, as he is perusing care at [facility] instead.

## 2025-06-30 ENCOUNTER — PATIENT OUTREACH (OUTPATIENT)
Dept: CARE COORDINATION | Facility: CLINIC | Age: 75
End: 2025-06-30
Payer: COMMERCIAL

## 2025-07-02 ENCOUNTER — PATIENT OUTREACH (OUTPATIENT)
Dept: CARE COORDINATION | Facility: CLINIC | Age: 75
End: 2025-07-02
Payer: COMMERCIAL